# Patient Record
Sex: MALE | Employment: FULL TIME | URBAN - METROPOLITAN AREA
[De-identification: names, ages, dates, MRNs, and addresses within clinical notes are randomized per-mention and may not be internally consistent; named-entity substitution may affect disease eponyms.]

---

## 2025-02-11 ENCOUNTER — APPOINTMENT (OUTPATIENT)
Dept: ORTHOPEDIC SURGERY | Facility: CLINIC | Age: 55
End: 2025-02-11
Payer: COMMERCIAL

## 2025-02-11 ENCOUNTER — OFFICE VISIT (OUTPATIENT)
Dept: ORTHOPEDIC SURGERY | Facility: HOSPITAL | Age: 55
End: 2025-02-11
Payer: COMMERCIAL

## 2025-02-11 ENCOUNTER — HOSPITAL ENCOUNTER (OUTPATIENT)
Dept: RADIOLOGY | Facility: HOSPITAL | Age: 55
Discharge: HOME | End: 2025-02-11
Payer: COMMERCIAL

## 2025-02-11 VITALS — HEIGHT: 71 IN | WEIGHT: 155 LBS | BODY MASS INDEX: 21.7 KG/M2

## 2025-02-11 DIAGNOSIS — M25.572 LEFT ANKLE PAIN, UNSPECIFIED CHRONICITY: ICD-10-CM

## 2025-02-11 DIAGNOSIS — S93.402A MODERATE LEFT ANKLE SPRAIN, INITIAL ENCOUNTER: Primary | ICD-10-CM

## 2025-02-11 PROCEDURE — 73610 X-RAY EXAM OF ANKLE: CPT | Mod: LT

## 2025-02-11 PROCEDURE — 99213 OFFICE O/P EST LOW 20 MIN: CPT | Performed by: SPECIALIST/TECHNOLOGIST

## 2025-02-11 PROCEDURE — 3008F BODY MASS INDEX DOCD: CPT | Performed by: SPECIALIST/TECHNOLOGIST

## 2025-02-11 PROCEDURE — 99203 OFFICE O/P NEW LOW 30 MIN: CPT | Performed by: SPECIALIST/TECHNOLOGIST

## 2025-02-11 ASSESSMENT — PAIN SCALES - GENERAL: PAINLEVEL_OUTOF10: 4

## 2025-02-11 ASSESSMENT — PAIN DESCRIPTION - DESCRIPTORS: DESCRIPTORS: ACHING;SHARP;SHOOTING

## 2025-02-11 ASSESSMENT — PAIN - FUNCTIONAL ASSESSMENT: PAIN_FUNCTIONAL_ASSESSMENT: 0-10

## 2025-02-12 NOTE — PROGRESS NOTES
Chief Complaint: Pain of the Left Ankle       HPI:  Julienne Mckeon is a 54 y.o. male skiing on fresh powder returned his left foot caught an edge his ankle inverted and he flew over his skis.  He states that his right ski popped off however his left one did not.  This was on the third run of the day and he finished skiing.  Today he reports an achy, throbby sensation over the lateral aspect of his ankle that worsens with planting and turning to the left, walking and especially barefoot.  He has been using ice.  He denies calf pain.  Denies prior injury.  Denies numbness or tingling into the foot or ankle.  Presents for treatment recommendations.    Objective     ROS:  Constitutional: No fever, no chills, not feeling tired, no recent weight gain and no recent weight loss  ENT: No nosebleeds  Cardiovascular: No chest pain  Respiratory: No shortness of breath and no cough  Gastrointestinal: No abdominal pain, no nausea, no diarrhea, and no vomiting  Musculoskeletal: Positive for left ankle pain  Integumentary: No rashes and no skin lesions  Neurological: No headache  Psychiatric: No sleep disturbances no depression  Endocrine: No muscle weakness and no muscle cramps  Hematologic/lymphatic: No swelling glands and no tendency for easy bruising    Past Medical History:   Diagnosis Date    Personal history of other diseases of the musculoskeletal system and connective tissue     History of low back pain    Personal history of other diseases of the respiratory system 12/24/2013    History of upper respiratory infection        Past Surgical History:   Procedure Laterality Date    SHOULDER SURGERY  06/12/2013    Shoulder Surgery Right        Social Connections: Not on file          Physical Exam:  General appearance: WN, WD male, in no acute distress  Skin: Ecchymosis seen over the left medial and lateral calcaneus.  No rashes, lesions or wounds  Head: Normocephalic, no evidence of trauma  Eye: EOMI, conjunctiva clear, no  discharge  ENT: Nares patent  Neck: No abnormal contour, tracheal midline  Chest/lungs: No respiratory distress, speaking in complete sentences  Musculoskeletal: Tender to palpation over the anterior talofibular ligament, calcaneofibular ligament.  Positive anterior drawer.  Positive talar tilt inversion.  Negative Klieger's.  Anterior shin is nontender to palpation.  Deltoid ligament is nontender to palpation.  4/5 manual muscle testing ankle eversion and dorsiflexion secondary to pain.  2+ pitting edema over the lateral ankle.  No decreased ROM, muscle wasting, rigidity    Neurological: A&O x3, no focal deficits, intact bilateral LE  Psych: normal affect, mood, appearance      Image Results:  X-rays taken on 2/11/2025 reviewed with the patient in the office today and show no acute fractures or dislocations.      Assessment/Plan   Encounter Diagnoses:  Moderate left ankle sprain, initial encounter    Left ankle pain, unspecified chronicity    Orders Placed This Encounter    Ankle Brace, Lace Up or A60    XR ankle left 3+ views    Referral to Physical Therapy       The patient and I discussed his clinical presentation and physical exam findings consistent with a grade 2 lateral ankle sprain.  We agreed upon initiating conservative treatment.  We discussed the use of a tall cam walker boot however he declined.  Therefore we agreed upon a lace up ankle brace to be worn at all times while ambulatory.  He is curious if he is able to ski this weekend, and I advised him that he could do this but this can really aggravate his ankle and prolong his recovery.  He should take this on a day-to-day basis.  He will take 800 mg of ibuprofen 3 times a day with food and to 500 mg extra strength Tylenol 3 times a day as needed for pain.  I encouraged him to elevate his extremity is much as he can throughout the day.  Ice his foot and ankle 15 to 20 minutes at a time 2-3 times per day.  He was provided with a referral to physical  therapy to focus on edema and pain control, strength, proprioception and home exercise regimen.  I am happy to see him back on an as-needed basis.  He is in agreement the plan.  Questions are answered.    Patient was prescribed a lace up ankle brace for left ankle sprain. The patient is ambulatory with or without aid; but, has weakness, instability and/or deformity of their left ankle which requires stabilization from this orthosis to improve their function.      Verbal and written instructions for the use, wear schedule, cleaning and application of this item were given.  Patient was instructed that should the brace result in increased pain, decreased sensation, increased swelling, or an overall worsening of their medical condition, to please contact our office immediately.     Orthotic management and training was provided for skin care, modifications due to healing tissues, edema changes, interruption in skin integrity, and safety precautions with the orthosis.        ** This office note was dictated using Dragon voice to text software and was not proofread for spelling or grammatical errors **

## 2025-02-18 ENCOUNTER — EVALUATION (OUTPATIENT)
Dept: PHYSICAL THERAPY | Facility: HOSPITAL | Age: 55
End: 2025-02-18
Payer: COMMERCIAL

## 2025-02-18 DIAGNOSIS — M25.672 ANKLE STIFFNESS, LEFT: Primary | ICD-10-CM

## 2025-02-18 DIAGNOSIS — M25.572 ACUTE LEFT ANKLE PAIN: ICD-10-CM

## 2025-02-18 DIAGNOSIS — M25.472 EDEMA OF LEFT ANKLE: ICD-10-CM

## 2025-02-18 DIAGNOSIS — S93.402D MODERATE LEFT ANKLE SPRAIN, SUBSEQUENT ENCOUNTER: ICD-10-CM

## 2025-02-18 DIAGNOSIS — M25.572 LEFT ANKLE PAIN: ICD-10-CM

## 2025-02-18 PROCEDURE — 97161 PT EVAL LOW COMPLEX 20 MIN: CPT | Mod: GP | Performed by: PHYSICAL THERAPIST

## 2025-02-18 PROCEDURE — 97110 THERAPEUTIC EXERCISES: CPT | Mod: GP | Performed by: PHYSICAL THERAPIST

## 2025-02-18 ASSESSMENT — PAIN SCALES - GENERAL: PAINLEVEL_OUTOF10: 2

## 2025-02-18 NOTE — PROGRESS NOTES
Physical Therapy  Physical Therapy Orthopedic Evaluation    Patient Name: Julienne Mckeon  MRN: [id number]  Today's Date: 2/18/2025       Insurance:  Visit number: 1 of 100  Authorization info: no auth (4 units a session +1 passive)  Insurance Type: A[employer]     General:  Reason for visit: left ankle sprain  Referred by: Sesar Stein PA-C  Sport: skiing     Current Problem:  1. Ankle stiffness, left        2. Moderate left ankle sprain, subsequent encounter  Referral to Physical Therapy      3. Left ankle pain        4. Edema of left ankle                 Medical History Form: Reviewed (scanned into chart)    Subjective:     Chief Complaint: Patient presents to clinic with a chief complaint of left ankle pain. Pt was skiing and injured his ankle. Pt had instance swelling and bruising. Pt consulted sports med and was diagnosis with grade 2 lateral ankle sprain. Pt states he has been significantly better. Pt is not utilizing walking boot and only wears brace when walking in the snow. Pt went skiing last weekend with minimal issues. Pt has been icing frequently  Onset Date: 2/9/2025  GYPSY: Skiing    Current Condition:   Better    Pain:  0-10 (Numeric) Pain Score: 2  Pain Location: Ankle  Pain Orientation: Left  Location: left ankle  Description: ache  Aggravating Factors:  weight bearing  Relieving Factors:  Rest, Elevation, Compression, and Ice    Relevant Information (PMH & Previous Tests/Imaging): x-ray  Previous Interventions/Treatments: None    Prior Level of Function (PLOF)  Patient previously independent with all ADLs  Exercise/Physical Activity: hiking and skiing      Patients Living Environment: Reviewed and no concern    Primary Language: English    There are no spiritual/cultural practices/values/needs that are important to know    Patient's Goal(s) for Therapy: to return to active life with no restrictions     Red Flags: Do you have any of the following? No  Fever/chills, unexplained weight changes,  "dizziness/fainting, unexplained change in bowel or bladder functions, unexplained malaise or muscle weakness, night pain/sweats, numbness or tingling    Objective:    ANKLE    Ankle AROM  R ankle dorsiflexion: (10°): CKC 43 deg  L ankle dorsiflexion: (10°): CKC 38 deg  R ankle plantarflexion: (40°): 40  L ankle plantarflexion: (40°): 40  R ankle inversion: (30°): 30  L ankle inversion: (30°): 25*  R ankle eversion: (20°): 20  L ankle eversion: (20°): 18*    Ankle MMT  L ankle dorsiflexion: (5/5): 4+/5  L ankle plantarflexion: (5/5): 4+/5  L ankle inversion: (5/5): 4+/5  L ankle eversion: (5/5): 4+/5    Gait  Gait Comment: antalgic gait with decrease stance time left compared to right      Outcome Measures:  Other Measures  Lower Extremity Funtional Score (LEFS): 36%       EDUCATION: Home exercise program, plan of care, activity modifications, pain management, and injury pathology       Goals: Set and discussed today      Plan of care was developed with input and agreement by the patient        Therapeutic Exercise:    15 min  Access Code: [email or web address]  URL: [email or web address]  Date: 02/18/2025  Prepared by: Jordan Batista    Exercises  - Standing Ankle Dorsiflexion Stretch  - 1-2 x daily - 7 x weekly - 2 sets - 2 reps - 30-45\" hold  - Standing Hip Abduction with Anchored Resistance  - 1-2 x daily - 7 x weekly - 2 sets - 12 reps  - Standing Hip Flexion with Anchored Resistance and Chair Support  - 1-2 x daily - 7 x weekly - 2 sets - 12 reps  - Standing Hip Adduction with Anchored Resistance  - 1-2 x daily - 7 x weekly - 2 sets - 12 reps  - Standing Hip Extension with Resistance  - 1-2 x daily - 7 x weekly - 2 sets - 12 reps  - Side Stepping with Resistance at Feet  - 1-2 x daily - 7 x weekly - 3 sets - 10 reps    Manual Therapy:     min      Neuromuscular Re-education:   min      Other:      min      Assessment: Patient presents with signs and symptoms consistent with lateral ankle sprain, resulting " in limited participation in pain-free ADLs and inability to perform at their prior level of function. Pt would benefit from physical therapy to address the impairments found & listed previously in the objective section in order to return to safe and pain-free ADLs and prior level of function.       Clinical Presentation: Stable and/or uncomplicated characteristics    Plan:     Planned Interventions include: therapeutic exercise, self-care home management, manual therapy, therapeutic activities, gait training, neuromuscular coordination, vasopneumatic, dry needling, aquatic therapy  Frequency: Every Other Week  Duration: 6 Weeks  Rehab potential/prognosis: Excellent    Jordan Batista, PT

## 2025-02-24 ENCOUNTER — OFFICE VISIT (OUTPATIENT)
Dept: URGENT CARE | Age: 55
End: 2025-02-24
Payer: COMMERCIAL

## 2025-02-24 VITALS
TEMPERATURE: 100.5 F | DIASTOLIC BLOOD PRESSURE: 68 MMHG | SYSTOLIC BLOOD PRESSURE: 104 MMHG | HEART RATE: 79 BPM | OXYGEN SATURATION: 96 % | RESPIRATION RATE: 18 BRPM

## 2025-02-24 DIAGNOSIS — J10.1 INFLUENZA A: Primary | ICD-10-CM

## 2025-02-24 DIAGNOSIS — J01.41 ACUTE RECURRENT PANSINUSITIS: ICD-10-CM

## 2025-02-24 PROCEDURE — 1036F TOBACCO NON-USER: CPT | Performed by: SPECIALIST

## 2025-02-24 PROCEDURE — 99204 OFFICE O/P NEW MOD 45 MIN: CPT | Performed by: SPECIALIST

## 2025-02-24 PROCEDURE — 99070 SPECIAL SUPPLIES PHYS/QHP: CPT | Performed by: SPECIALIST

## 2025-02-24 RX ORDER — OSELTAMIVIR PHOSPHATE 75 MG/1
75 CAPSULE ORAL 2 TIMES DAILY
Qty: 10 CAPSULE | Refills: 0 | Status: SHIPPED | OUTPATIENT
Start: 2025-02-24 | End: 2025-03-01

## 2025-02-24 ASSESSMENT — PATIENT HEALTH QUESTIONNAIRE - PHQ9
SUM OF ALL RESPONSES TO PHQ9 QUESTIONS 1 AND 2: 0
2. FEELING DOWN, DEPRESSED OR HOPELESS: NOT AT ALL
1. LITTLE INTEREST OR PLEASURE IN DOING THINGS: NOT AT ALL

## 2025-02-24 ASSESSMENT — ENCOUNTER SYMPTOMS
ACTIVITY CHANGE: 1
SINUS PAIN: 1
FLU SYMPTOMS: 1
SINUS PRESSURE: 1
EYES NEGATIVE: 1
COUGH: 1
CHILLS: 1
FEVER: 1

## 2025-02-24 NOTE — PATIENT INSTRUCTIONS
Increase Fluids    Rest   Take 2 Tylenol as needed up to 3 times a day    Take an Allergy pill such as Claritine or Zyrtec daily x 2 weeks   If you still were experiencing sinus congestion and pain in a week you need to have a follow up visit.

## 2025-02-24 NOTE — PROGRESS NOTES
Subjective   Patient ID: Julienne Mckeon is a 54 y.o. male. They present today with a chief complaint of Flu Symptoms (Chills and fever last night) and Sinusitis (States he has been battling his sinus drainage for weeks).    History of Present Illness    Flu Symptoms  Presenting symptoms: cough and fever    Associated symptoms: chills and nasal congestion    Sinusitis  Associated symptoms: chills, congestion, cough and fever        Past Medical History  Allergies as of 02/24/2025    (No Known Allergies)       (Not in a hospital admission)       Past Medical History:   Diagnosis Date    Personal history of other diseases of the musculoskeletal system and connective tissue     History of low back pain    Personal history of other diseases of the respiratory system 12/24/2013    History of upper respiratory infection       Past Surgical History:   Procedure Laterality Date    SHOULDER SURGERY  06/12/2013    Shoulder Surgery Right        reports that he has never smoked. He has never used smokeless tobacco.    Review of Systems  Review of Systems   Constitutional:  Positive for activity change, chills and fever.   HENT:  Positive for congestion, sinus pressure and sinus pain.    Eyes: Negative.    Respiratory:  Positive for cough.                                   Objective    Vitals:    02/24/25 1131   BP: 104/68   BP Location: Right arm   Patient Position: Sitting   Pulse: 79   Resp: 18   Temp: (!) 38.1 °C (100.5 °F)   TempSrc: Oral   SpO2: 96%     No LMP for male patient.    Physical Exam  Constitutional:       Appearance: Normal appearance.   HENT:      Nose: Congestion present.      Right Sinus: Maxillary sinus tenderness and frontal sinus tenderness present.      Left Sinus: Maxillary sinus tenderness and frontal sinus tenderness present.      Comments: Tenderness on multiple sinuses  Eyes:      Conjunctiva/sclera: Conjunctivae normal.   Lymphadenopathy:      Cervical: No cervical adenopathy.   Neurological:       Mental Status: He is alert.         Procedures    Point of Care Test & Imaging Results from this visit  No results found for this visit on 02/24/25.   No results found.    Diagnostic study results (if any) were reviewed by Sona Mathews MD.    Assessment/Plan   Allergies, medications, history, and pertinent labs/EKGs/Imaging reviewed by Sona Mathews MD.     Medical Decision Making      Orders and Diagnoses  There are no diagnoses linked to this encounter.    Medical Admin Record      Patient disposition: Home    Electronically signed by Sona Mathews MD  11:50 AM

## 2025-02-28 ENCOUNTER — APPOINTMENT (OUTPATIENT)
Dept: PHYSICAL THERAPY | Facility: HOSPITAL | Age: 55
End: 2025-02-28
Payer: COMMERCIAL

## 2025-03-12 ENCOUNTER — TREATMENT (OUTPATIENT)
Dept: PHYSICAL THERAPY | Facility: HOSPITAL | Age: 55
End: 2025-03-12
Payer: COMMERCIAL

## 2025-03-12 DIAGNOSIS — M25.572 LEFT ANKLE PAIN: ICD-10-CM

## 2025-03-12 DIAGNOSIS — S93.402D MODERATE LEFT ANKLE SPRAIN, SUBSEQUENT ENCOUNTER: ICD-10-CM

## 2025-03-12 DIAGNOSIS — M25.572 ACUTE LEFT ANKLE PAIN: ICD-10-CM

## 2025-03-12 DIAGNOSIS — M25.672 ANKLE STIFFNESS, LEFT: ICD-10-CM

## 2025-03-12 DIAGNOSIS — M25.472 EDEMA OF LEFT ANKLE: ICD-10-CM

## 2025-03-12 PROCEDURE — 97140 MANUAL THERAPY 1/> REGIONS: CPT | Mod: GP | Performed by: PHYSICAL THERAPIST

## 2025-03-12 PROCEDURE — 97110 THERAPEUTIC EXERCISES: CPT | Mod: GP | Performed by: PHYSICAL THERAPIST

## 2025-03-12 NOTE — PROGRESS NOTES
Physical Therapy  Physical Therapy Treatment Note    Patient Name: Julienne Mckeon  MRN: [id number]  Today's Date: 3/12/2025  Time Calculation  Start Time: 1100  Stop Time: 1140  Time Calculation (min): 40 min    Insurance:  Visit number: 2 of 100  Authorization info: no auth (4 units a session +1 passive)  Insurance Type: Aetna     General:  Reason for visit: left ankle sprain  Referred by: Sesar Stein PA-C  Sport: skiing     Current Problem  1. Moderate left ankle sprain, subsequent encounter  Follow Up In Physical Therapy      2. Ankle stiffness, left  Follow Up In Physical Therapy      3. Left ankle pain  Follow Up In Physical Therapy      4. Edema of left ankle  Follow Up In Physical Therapy      5. Acute left ankle pain [M25.572]  Follow Up In Physical Therapy          Subjective:     Patient reports he is doing relatively well. Pt states he has been skiing and trail walking. Pt states pain is minimal but feels his ankle continues to be stiff    Pain  Pain Assessment: 0-10  0-10 (Numeric) Pain Score: 0 - No pain    Performing HEP?: Partially      Objective:   Ankle AROM  R ankle dorsiflexion: (10°): CKC 43 deg  L ankle dorsiflexion: (10°): CKC 38 deg  R ankle plantarflexion: (40°): 40  L ankle plantarflexion: (40°): 40  R ankle inversion: (30°): 30  L ankle inversion: (30°): 25*  R ankle eversion: (20°): 20  L ankle eversion: (20°): 18*     Ankle MMT  L ankle dorsiflexion: (5/5): 4+/5  L ankle plantarflexion: (5/5): 4+/5  L ankle inversion: (5/5): 4+/5  L ankle eversion: (5/5): 4+/5         Treatment Performed:    Manual Therapy:    25 min  STM to gastroc and soleus group  TC distraction  TC AP grades 3-4  Proximal Tib/Fib PA with ankle DF grades 3-4      Therapeutic Exercise:    15 min  Half kneeling ankle self mob   Single leg stannce forcing eversion +paloff press       Assessment:   The focus of the session was ROM and motor control. The pt demonstrated good tolerance to the noted exercises today by reporting  improved ROM after STM. The pt is demonstrated good progress in skilled rehab at this time. The pt is still limited in overall strength, ROM, motor control, effusion, and pain at this time. The pt continues to be a good candidate for skilled PT, in order to further improve strength, ROM, flexibility, motor control, balance, gait mechanics, effusion, and pain.       Plan:  Follow up in 3-4 weeks to reassess      Jordan Batista PT

## 2025-03-13 ASSESSMENT — PAIN SCALES - GENERAL: PAINLEVEL_OUTOF10: 0 - NO PAIN

## 2025-03-13 ASSESSMENT — PAIN - FUNCTIONAL ASSESSMENT: PAIN_FUNCTIONAL_ASSESSMENT: 0-10

## 2025-04-09 ENCOUNTER — TREATMENT (OUTPATIENT)
Dept: PHYSICAL THERAPY | Facility: HOSPITAL | Age: 55
End: 2025-04-09
Payer: COMMERCIAL

## 2025-04-09 DIAGNOSIS — M25.672 ANKLE STIFFNESS, LEFT: ICD-10-CM

## 2025-04-09 DIAGNOSIS — M25.572 LEFT ANKLE PAIN: ICD-10-CM

## 2025-04-09 DIAGNOSIS — S93.402D MODERATE LEFT ANKLE SPRAIN, SUBSEQUENT ENCOUNTER: ICD-10-CM

## 2025-04-09 DIAGNOSIS — M25.572 ACUTE LEFT ANKLE PAIN: ICD-10-CM

## 2025-04-09 DIAGNOSIS — M25.472 EDEMA OF LEFT ANKLE: ICD-10-CM

## 2025-04-09 PROCEDURE — 97140 MANUAL THERAPY 1/> REGIONS: CPT | Mod: GP | Performed by: PHYSICAL THERAPIST

## 2025-04-09 PROCEDURE — 97110 THERAPEUTIC EXERCISES: CPT | Mod: GP | Performed by: PHYSICAL THERAPIST

## 2025-04-09 ASSESSMENT — PAIN SCALES - GENERAL: PAINLEVEL_OUTOF10: 0 - NO PAIN

## 2025-04-09 ASSESSMENT — PAIN - FUNCTIONAL ASSESSMENT: PAIN_FUNCTIONAL_ASSESSMENT: 0-10

## 2025-04-09 NOTE — PROGRESS NOTES
Physical Therapy  Physical Therapy Treatment Note    Patient Name: Julienne Mckeon  MRN: [id number]  Today's Date: 4/9/2025  Time Calculation  Start Time: 1100  Stop Time: 1140  Time Calculation (min): 40 min    Insurance:  Visit number: 3 of 100  Authorization info: no auth (4 units a session +1 passive)  Insurance Type: Aetna     General:  Reason for visit: left ankle sprain  Referred by: Sesar Stein PA-C  Sport: skiing     Current Problem  1. Moderate left ankle sprain, subsequent encounter  Follow Up In Physical Therapy      2. Ankle stiffness, left  Follow Up In Physical Therapy      3. Left ankle pain  Follow Up In Physical Therapy      4. Edema of left ankle  Follow Up In Physical Therapy      5. Acute left ankle pain [M25.572]  Follow Up In Physical Therapy          Subjective:   Pt arrived to therapy reporting he is feeling really well overall. Pt states he still feels stiff at times but feels like today can be his last visit      Pain  Pain Assessment: 0-10  0-10 (Numeric) Pain Score: 0 - No pain    Performing HEP?: Partially      Objective:   Ankle AROM  R ankle dorsiflexion: (10°): CKC 44 deg  L ankle dorsiflexion: (10°): CKC 42deg  R ankle plantarflexion: (40°): 40  L ankle plantarflexion: (40°): 40  R ankle inversion: (30°): 30  L ankle inversion: (30°): 25*  R ankle eversion: (20°): 20  L ankle eversion: (20°): 18*     Ankle MMT  L ankle dorsiflexion: (5/5): 4+/5  L ankle plantarflexion: (5/5): 4+/5  L ankle inversion: (5/5): 4+/5  L ankle eversion: (5/5): 4+/5         Treatment Performed:    Manual Therapy:    25 min  STM to gastroc and soleus group  TC distraction  TC AP grades 3-4        Therapeutic Exercise:    15 min  Review of HEP  Sciatic nerve flossing      Assessment:   The focus of the session was reassessment and discharge planning The pt demonstrated good tolerance to the noted exercises today by demonstrating improved ROM and less pain. The pt is demonstrated good progress in skilled rehab at  this time. Pt has achieved all established goals      Plan:  Discharge POC      Jordan Batista, PT